# Patient Record
Sex: MALE | Race: WHITE
[De-identification: names, ages, dates, MRNs, and addresses within clinical notes are randomized per-mention and may not be internally consistent; named-entity substitution may affect disease eponyms.]

---

## 2018-08-29 ENCOUNTER — HOSPITAL ENCOUNTER (EMERGENCY)
Dept: HOSPITAL 50 - VM.ED | Age: 43
Discharge: HOME | End: 2018-08-29
Payer: COMMERCIAL

## 2018-08-29 DIAGNOSIS — R07.9: Primary | ICD-10-CM

## 2018-08-29 DIAGNOSIS — I10: ICD-10-CM

## 2018-08-29 LAB
ANION GAP SERPL CALC-SCNC: 13.4 MMOL/L (ref 10–20)
CHLORIDE SERPL-SCNC: 104 MMOL/L (ref 98–107)
SODIUM SERPL-SCNC: 139 MMOL/L (ref 136–145)

## 2018-08-29 PROCEDURE — 85610 PROTHROMBIN TIME: CPT

## 2018-08-29 PROCEDURE — 71046 X-RAY EXAM CHEST 2 VIEWS: CPT

## 2018-08-29 PROCEDURE — 83735 ASSAY OF MAGNESIUM: CPT

## 2018-08-29 PROCEDURE — 99284 EMERGENCY DEPT VISIT MOD MDM: CPT

## 2018-08-29 PROCEDURE — 85025 COMPLETE CBC W/AUTO DIFF WBC: CPT

## 2018-08-29 PROCEDURE — 36415 COLL VENOUS BLD VENIPUNCTURE: CPT

## 2018-08-29 PROCEDURE — 80053 COMPREHEN METABOLIC PANEL: CPT

## 2018-08-29 PROCEDURE — 84100 ASSAY OF PHOSPHORUS: CPT

## 2018-08-29 PROCEDURE — 85379 FIBRIN DEGRADATION QUANT: CPT

## 2018-08-29 PROCEDURE — 86140 C-REACTIVE PROTEIN: CPT

## 2018-08-29 PROCEDURE — 84484 ASSAY OF TROPONIN QUANT: CPT

## 2018-08-29 PROCEDURE — 96374 THER/PROPH/DIAG INJ IV PUSH: CPT

## 2018-08-29 NOTE — EDM.PDOC
ED HPI GENERAL MEDICAL PROBLEM





- General


Chief Complaint: Upper Extremity Injury/Pain


Stated Complaint: left scapula, shoulder,arm pain


Time Seen by Provider: 08/29/18 04:21


Source of Information: Reports: Patient, EMS


History Limitations: Reports: No Limitations





- History of Present Illness


INITIAL COMMENTS - FREE TEXT/NARRATIVE: 





Pt. presents to ER with complaints of L scapula/upper posterior chest pain that 

has been present for the past several days. States the discomfort is 

respirophasic in nature. No substernal chest pain. No shortness of breath. 

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 

kidney, and R eye. He states that she is not short of breath, but states it 

hurts to breath deeply. No hemoptysis. No recent illnesses.


Location: Reports: Chest, Back


Quality: Reports: Sharp, Stabbing


Improves with: Reports: Rest


Worsens with: Reports: Movement


  ** Left Shoulder


Pain Score (Numeric/FACES): 6





- Related Data


 Allergies











Allergy/AdvReac Type Severity Reaction Status Date / Time


 


No Known Allergies Allergy   Verified 08/29/18 04:18











Home Meds: 


 Home Meds





Lisinopril 40 mg PO DAILY 08/29/18 [History]











Past Medical History


Cardiovascular History: Reports: Hypertension





Social & Family History





- Tobacco Use


Smoking Status *Q: Never Smoker





Review of Systems





- Review of Systems


Review Of Systems: See Below


Constitutional: Reports: No Symptoms


Eyes: Reports: No Symptoms


Ears: Reports: No Symptoms


Nose: Reports: No Symptoms


Mouth/Throat: Reports: No Symptoms


Respiratory: Reports: Pleuritic Chest Pain


Cardiovascular: Reports: No Symptoms


GI/Abdominal: Reports: No Symptoms


Genitourinary: Reports: No Symptoms


Musculoskeletal: Reports: No Symptoms


Skin: Reports: No Symptoms


Neurological: Reports: No Symptoms


Psychiatric: Reports: No Symptoms





ED EXAM, GENERAL





- Physical Exam


Exam: See Below


Exam Limited By: No Limitations


General Appearance: Alert, WD/WN, No Apparent Distress


Throat/Mouth: Normal Inspection, Normal Lips, Normal Teeth, Normal Gums, Normal 

Oropharynx, Normal Voice, No Airway Compromise


Head: Atraumatic, Normocephalic


Neck: Normal Inspection, Supple, Non-Tender, Full Range of Motion


Respiratory/Chest: No Respiratory Distress, Lungs Clear, Normal Breath Sounds, 

No Accessory Muscle Use, Other (L sided posterior chest pain, between edge of 

scapula and vertebre.)


Cardiovascular: Normal Peripheral Pulses, Regular Rate, Rhythm, No Edema, No 

Gallop, No JVD, No Murmur, No Rub


Peripheral Pulses: 4+: Radial (L)


GI/Abdominal: Normal Bowel Sounds, Soft, Non-Tender, No Organomegaly, No 

Distention, No Abnormal Bruit, No Mass


 (Male) Exam: Deferred


Rectal (Males) Exam: Deferred


Back Exam: Other (see above. No deformity. ROM to L upper extremity is WNL)


Extremities: Normal Inspection, Normal Range of Motion, Non-Tender, Normal 

Capillary Refill, No Pedal Edema


Neurological: Alert, Oriented, CN II-XII Intact, Normal Cognition, Normal Gait, 

Normal Reflexes, No Motor/Sensory Deficits


Skin Exam: Warm, Dry, Intact, Normal Color, No Rash





Course





- Vital Signs


Last Recorded V/S: 





 Last Vital Signs











Temp  36.4 C   08/29/18 04:18


 


Pulse  78   08/29/18 04:18


 


Resp  16   08/29/18 04:18


 


BP  141/89 H  08/29/18 04:18


 


Pulse Ox  100   08/29/18 04:18














- Orders/Labs/Meds


Orders: 





 Active Orders 24 hr











 Category Date Time Status


 


 EKG Documentation Completion [RC] STAT Care  08/29/18 04:30 Inactive


 


 Chest 2V [CR] Stat Exams  08/29/18 05:36 Ordered











Labs: 





 Laboratory Tests











  08/29/18 08/29/18 08/29/18 Range/Units





  04:40 04:40 04:40 


 


WBC  10.2 H    (4.0-10.0)  x10^3/uL


 


RBC  4.67    (4.5-6.0)  x10^6/uL


 


Hgb  14.8    (14.0-18.0)  g/dL


 


Hct  42.1    (40.0-52.0)  %


 


MCV  90.1    (78.0-93.0)  fL


 


MCH  31.7    (26.0-32.0)  pg


 


MCHC  35.2    (32.0-36.0)  g/dL


 


RDW Coeff of Frank  12.2    (10.0-15.0)  %


 


Plt Count  203    (130-400)  x10^3/uL


 


Neut % (Auto)  62.7    (50.0-80.0)  %


 


Lymph % (Auto)  24.2 L    (25.0-50.0)  %


 


Mono % (Auto)  6.9    (2.0-11.0)  %


 


Eos % (Auto)  5.9 H    (0.0-4.0)  %


 


Baso % (Auto)  0.3    (0.2-1.2)  %


 


PT   10.4   (9.6-11.4)  SEC


 


INR   1.0 L   (2.0-3.5)  


 


D-Dimer, Quantitative   0.25   (<=0.58)  mg/LFEU


 


Sodium    139  (136-145)  mmol/L


 


Potassium    4.4  (3.5-5.1)  mmol/L


 


Chloride    104  ()  mmol/L


 


Carbon Dioxide    26  (21-32)  mmol/L


 


Anion Gap    13.4  (10-20)  mmol/L


 


BUN    21 H  (7-18)  mg/dL


 


Creatinine    1.1  (0.70-1.30)  mg/dL


 


Est Cr Clr Drug Dosing    TNP  


 


Estimated GFR (MDRD)    > 60  


 


Glucose    107 H  ()  mg/dL


 


Calcium    9.0  (8.5-10.1)  mg/dL


 


Corrected Calcium    9.16  (8.5-10.1)  mg/dL


 


Phosphorus    3.1  (2.6-4.7)  mg/dL


 


Magnesium    2.1  (1.8-2.4)  mg/dL


 


Total Bilirubin    0.4  (0.2-1.0)  mg/dL


 


AST    13 L  (15-37)  U/L


 


ALT    25  (16-63)  U/L


 


Alkaline Phosphatase    54  ()  U/L


 


Troponin I    < 0.017  (<=0.056)  ng/mL


 


C-Reactive Protein    < 0.2  (<=0.9)  mg/dL


 


Total Protein    7.8  (6.4-8.2)  g/dL


 


Albumin    3.8  (3.4-5.0)  g/dL


 


Globulin    4.0  


 


Albumin/Globulin Ratio    0.95  














Departure





- Departure


Time of Disposition: 05:56


Disposition: Home, Self-Care 01


Condition: Good


Clinical Impression: 


 Chest pain, atypical








- Discharge Information


Referrals: 


PCP,Unobtain [Primary Care Provider] - 





- Problem List Review


Problem List Initiated/Reviewed/Updated: Yes





- My Orders


Last 24 Hours: 





My Active Orders





08/29/18 04:30


EKG Documentation Completion [RC] STAT 





08/29/18 05:36


Chest 2V [CR] Stat 














- Assessment/Plan


Last 24 Hours: 





My Active Orders





08/29/18 04:30


EKG Documentation Completion [RC] STAT 





08/29/18 05:36


Chest 2V [CR] Stat